# Patient Record
(demographics unavailable — no encounter records)

---

## 2024-10-24 NOTE — PHYSICAL EXAM
[No Acute Distress] : no acute distress [Well Nourished] : well nourished [Well Developed] : well developed [Well-Appearing] : well-appearing [Normal Sclera/Conjunctiva] : normal sclera/conjunctiva [PERRL] : pupils equal round and reactive to light [EOMI] : extraocular movements intact [Normal Outer Ear/Nose] : the outer ears and nose were normal in appearance [Normal Oropharynx] : the oropharynx was normal [No JVD] : no jugular venous distention [No Lymphadenopathy] : no lymphadenopathy [Supple] : supple [Thyroid Normal, No Nodules] : the thyroid was normal and there were no nodules present [No Respiratory Distress] : no respiratory distress  [No Accessory Muscle Use] : no accessory muscle use [Clear to Auscultation] : lungs were clear to auscultation bilaterally [Normal Rate] : normal rate  [Regular Rhythm] : with a regular rhythm [Normal S1, S2] : normal S1 and S2 [No Murmur] : no murmur heard [No Carotid Bruits] : no carotid bruits [No Abdominal Bruit] : a ~M bruit was not heard ~T in the abdomen [No Varicosities] : no varicosities [Pedal Pulses Present] : the pedal pulses are present [No Edema] : there was no peripheral edema [No Palpable Aorta] : no palpable aorta [No Extremity Clubbing/Cyanosis] : no extremity clubbing/cyanosis [Soft] : abdomen soft [Non Tender] : non-tender [Non-distended] : non-distended [No Masses] : no abdominal mass palpated [No HSM] : no HSM [Normal Bowel Sounds] : normal bowel sounds [No CVA Tenderness] : no CVA  tenderness [No Spinal Tenderness] : no spinal tenderness [No Joint Swelling] : no joint swelling [Grossly Normal Strength/Tone] : grossly normal strength/tone [No Rash] : no rash [Coordination Grossly Intact] : coordination grossly intact [No Focal Deficits] : no focal deficits [Normal Gait] : normal gait [Deep Tendon Reflexes (DTR)] : deep tendon reflexes were 2+ and symmetric [Normal Affect] : the affect was normal [Normal Insight/Judgement] : insight and judgment were intact [None] : no ulcers in either foot were found [] : both feet [de-identified] : increased hr [TWNoteComboBox1] : +1 [TWNoteComboBox2] : +1 [TWNoteComboBox3] : +1 [TWNoteComboBox5] : +1 [TWNoteComboBox6] : +1

## 2024-10-24 NOTE — HISTORY OF PRESENT ILLNESS
[FreeTextEntry1] : follow-up [de-identified] : 10/23/24: Patient spending most of her time in [place], with help at home. patient continues to have stable short-term memory loss, but has not followed up with neurologist. Patient had porcine valve replacement, and has been feeling better. However, continues to ankle swelling. Brought in lab work. Recently saw cardiologist...reportedly stable.  increased salt intact  c/o increased nocturia 12/6/23: Patient had an hospitalization, and subsequent rehab after multiple falls, and pre-syncope. Much improved symptoms now. patient needs med renewals.  6/28/2023: evaluated by neuro for memory loss, recommended diet and exercise.  states she has not been active, but will be when back in [place]. twins in [place] (edelmira's). 12 grandchildren. 1.Edelmira [facility] 4  2. lauren riverelile 3  3. leslie mendez 3 4. johnny 2  c/o: vertigo persists, happens intermittently, patient stops walking and rests, and it reported subsides. Patient is still not walking as well as she once did, and uses a cane. night sweats times 2 weeks increased urination, off tolteride    11/18/2022: evaluated by neuro for memory loss, recommended diet and exercise.  states she has not been active, but will be when back in [place]. Awaiting grandchild to be born. waiting too much TV  7/10/2022: doing well in [place], denies c/o except for swelling left leg, which has chronic times several years. denies pain, redness, inflammation. recently had good check-up with cardiologist.  states that forgetfulness has improved. son is expecting child in November. [place] with [place] activities, etc

## 2024-10-24 NOTE — REVIEW OF SYSTEMS
[Lower Ext Edema] : lower extremity edema [Frequency] : frequency [Dizziness] : dizziness [Memory Loss] : memory loss [Unsteady Walking] : ataxia [Negative] : Heme/Lymph [Headache] : no headache [Fainting] : no fainting [Confusion] : no confusion [FreeTextEntry5] : chronic left greater than right 1 plus edema

## 2024-10-24 NOTE — PHYSICAL EXAM
[No Acute Distress] : no acute distress [Well Nourished] : well nourished [Well Developed] : well developed [Well-Appearing] : well-appearing [Normal Sclera/Conjunctiva] : normal sclera/conjunctiva [PERRL] : pupils equal round and reactive to light [EOMI] : extraocular movements intact [Normal Outer Ear/Nose] : the outer ears and nose were normal in appearance [Normal Oropharynx] : the oropharynx was normal [No JVD] : no jugular venous distention [No Lymphadenopathy] : no lymphadenopathy [Supple] : supple [Thyroid Normal, No Nodules] : the thyroid was normal and there were no nodules present [No Respiratory Distress] : no respiratory distress  [No Accessory Muscle Use] : no accessory muscle use [Clear to Auscultation] : lungs were clear to auscultation bilaterally [Normal Rate] : normal rate  [Regular Rhythm] : with a regular rhythm [Normal S1, S2] : normal S1 and S2 [No Murmur] : no murmur heard [No Carotid Bruits] : no carotid bruits [No Abdominal Bruit] : a ~M bruit was not heard ~T in the abdomen [No Varicosities] : no varicosities [Pedal Pulses Present] : the pedal pulses are present [No Edema] : there was no peripheral edema [No Palpable Aorta] : no palpable aorta [No Extremity Clubbing/Cyanosis] : no extremity clubbing/cyanosis [Soft] : abdomen soft [Non Tender] : non-tender [Non-distended] : non-distended [No Masses] : no abdominal mass palpated [No HSM] : no HSM [Normal Bowel Sounds] : normal bowel sounds [No CVA Tenderness] : no CVA  tenderness [No Spinal Tenderness] : no spinal tenderness [No Joint Swelling] : no joint swelling [Grossly Normal Strength/Tone] : grossly normal strength/tone [No Rash] : no rash [Coordination Grossly Intact] : coordination grossly intact [No Focal Deficits] : no focal deficits [Normal Gait] : normal gait [Deep Tendon Reflexes (DTR)] : deep tendon reflexes were 2+ and symmetric [Normal Affect] : the affect was normal [Normal Insight/Judgement] : insight and judgment were intact [None] : no ulcers in either foot were found [] : both feet [de-identified] : increased hr [TWNoteComboBox1] : +1 [TWNoteComboBox2] : +1 [TWNoteComboBox3] : +1 [TWNoteComboBox5] : +1 [TWNoteComboBox6] : +1

## 2024-10-24 NOTE — PLAN
[FreeTextEntry1] : 12/6/23: Patient had hospitalization, and subsequent rehab for multiple falls in [place]. Patient is doing much better now, except she continues to experience memory loss. Long term memory is "ok", however, patient has difficulty with some executive functioning, and short term memory. Patuient c/o "night sweats", which interfere with sleep   last a1c by cardio 6.o.chronic edema unchanged. recommend: elevate legs when sitting, compression stockings. weight loss

## 2024-10-24 NOTE — HISTORY OF PRESENT ILLNESS
[FreeTextEntry1] : follow-up [de-identified] : 10/23/24: Patient spending most of her time in [place], with help at home. patient continues to have stable short-term memory loss, but has not followed up with neurologist. Patient had porcine valve replacement, and has been feeling better. However, continues to ankle swelling. Brought in lab work. Recently saw cardiologist...reportedly stable.  increased salt intact  c/o increased nocturia 12/6/23: Patient had an hospitalization, and subsequent rehab after multiple falls, and pre-syncope. Much improved symptoms now. patient needs med renewals.  6/28/2023: evaluated by neuro for memory loss, recommended diet and exercise.  states she has not been active, but will be when back in [place]. twins in [place] (edelmira's). 12 grandchildren. 1.Edelmira [facility] 4  2. lauren riverelile 3  3. leslie mendez 3 4. johnny 2  c/o: vertigo persists, happens intermittently, patient stops walking and rests, and it reported subsides. Patient is still not walking as well as she once did, and uses a cane. night sweats times 2 weeks increased urination, off tolteride    11/18/2022: evaluated by neuro for memory loss, recommended diet and exercise.  states she has not been active, but will be when back in [place]. Awaiting grandchild to be born. waiting too much TV  7/10/2022: doing well in [place], denies c/o except for swelling left leg, which has chronic times several years. denies pain, redness, inflammation. recently had good check-up with cardiologist.  states that forgetfulness has improved. son is expecting child in November. Keeping busy with [place] activities, etc

## 2024-10-24 NOTE — DISCUSSION/SUMMARY
[FreeTextEntry1] : 12/7/23: reviewed bw from cardiologists; all parameters have improved; therefore, it is recommended that she decreasing meds would be helpful for nightime symtoms of diaphoresis. Encouraged continued PT/OT/speech. discussed multiple resources for dementia  6/28/23: pt called with bloodwork results - improvement in eGFR and creat/ pt now on quinipril 40 bid  and metformin 1000 am and 500 pm  will monitor BP and follow up

## 2025-05-29 NOTE — PHYSICAL EXAM
[No Acute Distress] : no acute distress [Well Nourished] : well nourished [Well Developed] : well developed [Well-Appearing] : well-appearing [Normal Sclera/Conjunctiva] : normal sclera/conjunctiva [PERRL] : pupils equal round and reactive to light [EOMI] : extraocular movements intact [Normal Outer Ear/Nose] : the outer ears and nose were normal in appearance [Normal Oropharynx] : the oropharynx was normal [No JVD] : no jugular venous distention [No Lymphadenopathy] : no lymphadenopathy [Supple] : supple [Thyroid Normal, No Nodules] : the thyroid was normal and there were no nodules present [No Respiratory Distress] : no respiratory distress  [No Accessory Muscle Use] : no accessory muscle use [Clear to Auscultation] : lungs were clear to auscultation bilaterally [Normal Rate] : normal rate  [Regular Rhythm] : with a regular rhythm [Normal S1, S2] : normal S1 and S2 [No Murmur] : no murmur heard [No Carotid Bruits] : no carotid bruits [No Abdominal Bruit] : a ~M bruit was not heard ~T in the abdomen [No Varicosities] : no varicosities [Pedal Pulses Present] : the pedal pulses are present [No Edema] : there was no peripheral edema [No Palpable Aorta] : no palpable aorta [No Extremity Clubbing/Cyanosis] : no extremity clubbing/cyanosis [Soft] : abdomen soft [Non Tender] : non-tender [Non-distended] : non-distended [No Masses] : no abdominal mass palpated [No HSM] : no HSM [Normal Bowel Sounds] : normal bowel sounds [No CVA Tenderness] : no CVA  tenderness [No Spinal Tenderness] : no spinal tenderness [No Joint Swelling] : no joint swelling [Grossly Normal Strength/Tone] : grossly normal strength/tone [No Rash] : no rash [Coordination Grossly Intact] : coordination grossly intact [No Focal Deficits] : no focal deficits [Normal Gait] : normal gait [Deep Tendon Reflexes (DTR)] : deep tendon reflexes were 2+ and symmetric [Normal Affect] : the affect was normal [Normal Insight/Judgement] : insight and judgment were intact [None] : no ulcers in either foot were found [] : both feet [de-identified] : increased hr [TWNoteComboBox1] : +1 [TWNoteComboBox2] : +1 [TWNoteComboBox3] : +1 [TWNoteComboBox5] : +1 [TWNoteComboBox6] : +1

## 2025-05-29 NOTE — PHYSICAL EXAM
[No Acute Distress] : no acute distress [Well Nourished] : well nourished [Well Developed] : well developed [Well-Appearing] : well-appearing [Normal Sclera/Conjunctiva] : normal sclera/conjunctiva [PERRL] : pupils equal round and reactive to light [EOMI] : extraocular movements intact [Normal Outer Ear/Nose] : the outer ears and nose were normal in appearance [Normal Oropharynx] : the oropharynx was normal [No JVD] : no jugular venous distention [No Lymphadenopathy] : no lymphadenopathy [Supple] : supple [Thyroid Normal, No Nodules] : the thyroid was normal and there were no nodules present [No Respiratory Distress] : no respiratory distress  [No Accessory Muscle Use] : no accessory muscle use [Clear to Auscultation] : lungs were clear to auscultation bilaterally [Normal Rate] : normal rate  [Regular Rhythm] : with a regular rhythm [Normal S1, S2] : normal S1 and S2 [No Murmur] : no murmur heard [No Carotid Bruits] : no carotid bruits [No Abdominal Bruit] : a ~M bruit was not heard ~T in the abdomen [No Varicosities] : no varicosities [Pedal Pulses Present] : the pedal pulses are present [No Edema] : there was no peripheral edema [No Palpable Aorta] : no palpable aorta [No Extremity Clubbing/Cyanosis] : no extremity clubbing/cyanosis [Soft] : abdomen soft [Non Tender] : non-tender [Non-distended] : non-distended [No Masses] : no abdominal mass palpated [No HSM] : no HSM [Normal Bowel Sounds] : normal bowel sounds [No CVA Tenderness] : no CVA  tenderness [No Spinal Tenderness] : no spinal tenderness [No Joint Swelling] : no joint swelling [Grossly Normal Strength/Tone] : grossly normal strength/tone [No Rash] : no rash [Coordination Grossly Intact] : coordination grossly intact [No Focal Deficits] : no focal deficits [Normal Gait] : normal gait [Deep Tendon Reflexes (DTR)] : deep tendon reflexes were 2+ and symmetric [Normal Affect] : the affect was normal [Normal Insight/Judgement] : insight and judgment were intact [None] : no ulcers in either foot were found [] : both feet [de-identified] : increased hr [TWNoteComboBox1] : +1 [TWNoteComboBox2] : +1 [TWNoteComboBox3] : +1 [TWNoteComboBox5] : +1 [TWNoteComboBox6] : +1

## 2025-05-29 NOTE — HISTORY OF PRESENT ILLNESS
[FreeTextEntry1] : follow-up [de-identified] : 5/29/25: patient just saw dr. Ellison and was found to have high HR and was put on Toprol with good effect. also was taken of metformin and put on glyceride for a1c 7.7.  will be going back to [place] soon.  states that memory is about the same, and she has bilateral knee pain. Patient has not been to neurologist in "awhile". Patient is not motivated to ambulate due to pain in knees. sits many hours in chair watch [employer] news.    Edelmira 51 yo has 2 year old and older twins lives in [facility]  sol:  Jaya     10/23/24: Patient spending most of her time in [place], with help at home. patient continues to have stable short-term memory loss but has not followed up with neurologist. Patient had porcine valve replacement and has been feeling better. However, continues to ankle swelling. Brought in lab work. Recently saw cardiologist...reportedly stable.  increased salt intact  c/o increased nocturia 12/6/23: Patient had an hospitalization, and subsequent rehab after multiple falls, and pre-syncope. Much improved symptoms now. patient needs med renewals.  6/28/2023: evaluated by neuro for memory loss, recommended diet and exercise.  states she has not been active, but will be when back in [place]. twins in [place] (edelmira's). 12 grandchildren. 1.Edelmira [facility] 4  2. lauren [place] 3  3. leslie mendez 3 4. johnny 2  c/o: vertigo persists, happens intermittently, patient stops walking and rests, and it reported subsides. Patient is still not walking as well as she once did, and uses a cane. night sweats times 2 weeks increased urination, off tolteride    11/18/2022: evaluated by neuro for memory loss, recommended diet and exercise.  states she has not been active, but will be when back in [place]. Awaiting grandchild to be born. waiting too much TV  7/10/2022: doing well in [place], denies c/o except for swelling left leg, which has chronic times several years. denies pain, redness, inflammation. recently had good check-up with cardiologist.  states that forgetfulness has improved. son is expecting child in November. Keeping busy with [place] activities, etc

## 2025-05-29 NOTE — PLAN
[FreeTextEntry1] : 5/29/25: discussed need to ambulate more frequently. recommend voltaren gel and tylenol for pain.   12/6/23: Patient had hospitalization, and subsequent rehab for multiple falls in [place]. Patient is doing much better now, except she continues to experience memory loss. Long term memory is "ok", however, patient has difficulty with some executive functioning, and short term memory. Patient c/o "night sweats", which interfere with sleep   last a1c by cardio 6.o.chronic edema unchanged. recommend: elevate legs when sitting, compression stockings. weight loss

## 2025-05-29 NOTE — HISTORY OF PRESENT ILLNESS
[FreeTextEntry1] : follow-up [de-identified] : 5/29/25: patient just saw dr. Ellison and was found to have high HR and was put on Toprol with good effect. also was taken of metformin and put on glyceride for a1c 7.7.  will be going back to [place] soon.  states that memory is about the same, and she has bilateral knee pain. Patient has not been to neurologist in "awhile". Patient is not motivated to ambulate due to pain in knees. sits many hours in chair watch [employer] news.    Edelmira 51 yo has 2 year old and older twins lives in [facility]  sol:  Jaya     10/23/24: Patient spending most of her time in [place], with help at home. patient continues to have stable short-term memory loss but has not followed up with neurologist. Patient had porcine valve replacement and has been feeling better. However, continues to ankle swelling. Brought in lab work. Recently saw cardiologist...reportedly stable.  increased salt intact  c/o increased nocturia 12/6/23: Patient had an hospitalization, and subsequent rehab after multiple falls, and pre-syncope. Much improved symptoms now. patient needs med renewals.  6/28/2023: evaluated by neuro for memory loss, recommended diet and exercise.  states she has not been active, but will be when back in [place]. twins in [place] (edelmira's). 12 grandchildren. 1.Edelmira [facility] 4  2. lauren [place] 3  3. leslie mendez 3 4. johnny 2  c/o: vertigo persists, happens intermittently, patient stops walking and rests, and it reported subsides. Patient is still not walking as well as she once did, and uses a cane. night sweats times 2 weeks increased urination, off tolteride    11/18/2022: evaluated by neuro for memory loss, recommended diet and exercise.  states she has not been active, but will be when back in [place]. Awaiting grandchild to be born. waiting too much TV  7/10/2022: doing well in [place], denies c/o except for swelling left leg, which has chronic times several years. denies pain, redness, inflammation. recently had good check-up with cardiologist.  states that forgetfulness has improved. son is expecting child in November. Keeping busy with [place] activities, etc